# Patient Record
Sex: MALE
[De-identification: names, ages, dates, MRNs, and addresses within clinical notes are randomized per-mention and may not be internally consistent; named-entity substitution may affect disease eponyms.]

---

## 2024-04-29 ENCOUNTER — APPOINTMENT (OUTPATIENT)
Dept: UROLOGY | Facility: CLINIC | Age: 44
End: 2024-04-29
Payer: COMMERCIAL

## 2024-04-29 VITALS
SYSTOLIC BLOOD PRESSURE: 122 MMHG | BODY MASS INDEX: 27.69 KG/M2 | TEMPERATURE: 95.3 F | HEIGHT: 71.1 IN | RESPIRATION RATE: 18 BRPM | HEART RATE: 64 BPM | WEIGHT: 200 LBS | OXYGEN SATURATION: 98 % | DIASTOLIC BLOOD PRESSURE: 80 MMHG

## 2024-04-29 DIAGNOSIS — N20.0 CALCULUS OF KIDNEY: ICD-10-CM

## 2024-04-29 DIAGNOSIS — Z12.5 ENCOUNTER FOR SCREENING FOR MALIGNANT NEOPLASM OF PROSTATE: ICD-10-CM

## 2024-04-29 PROBLEM — Z00.00 ENCOUNTER FOR PREVENTIVE HEALTH EXAMINATION: Status: ACTIVE | Noted: 2024-04-29

## 2024-04-29 PROCEDURE — 99204 OFFICE O/P NEW MOD 45 MIN: CPT

## 2024-04-29 NOTE — HISTORY OF PRESENT ILLNESS
[FreeTextEntry1] :   ALICE JACOBS Feb 14 1980   Language: English  Date of First visit: 04/29/2024 Accompanied by: self  Contact info:  Referring Provider/PCP: Dr. Drummond Fax:  [phone]   CC/ Problem List:  general check up =============================================================================== FIRST VISIT / Summary: Very pleasant 44 year old M here for general check up. no urinary complaints. Content with urinary pattern.  Drinks 2L fluids daily Denies hematuria, or dysuria  ------------------------------------------------------------------------------------------- INTERVAL VISITS:   ===============================================================================   PMH: kidney stones passed independently  Meds: denies  All: denies FHx: no  malignancies SocHx: non smoker, no Etoh, , 1 child, buidling manager   PSH: denies    ROS: Review of Systems is as per HPI unless otherwise denoted below   =============================================================================== DATA: LABS (SELECTED):---------------------------------------------------------------------------------------------------  2/27/24: PSA 1.21, Cr wnl, UA wnl   RADS:-------------------------------------------------------------------------------------------------------------------     PATHOLOGY/CYTOLOGY:-------------------------------------------------------------------------------------------     VOIDING STUDIES: ----------------------------------------------------------------------------------------------------     STONE STUDIES: (Analysis/LLSA)----------------------------------------------------------------------------------     PROCEDURES: -----------------------------------------------------------------------------------------------       =============================================================================== PHYSICAL EXAM:    FOCUSED: ----------------------------------------------------------------------------------------------------------------     ======================================================================================= DISCUSSION: ======================================================================================= ASSESSMENT and PLAN   1. kidney stones -advised to increase water intake to 2.5L-3 - continue periodic US with PCP  2. PSA screening - low risk - recheck every 1-2 years with PCP   =======================================================================================  4 Thank you for allowing me to assist in the care of your patient. Should you have any questions please do not hesitate to reach out to me.     Kne Kaur MD                                                        [facility] Physician [facility]   [place] Office: [address], [address] [place] [address] T: [phone] F: [phone]   [place] Office: [address] [place] [address], [address]st [place] [address] T: [phone] F: [phone]